# Patient Record
Sex: MALE | Race: WHITE | Employment: FULL TIME | URBAN - METROPOLITAN AREA
[De-identification: names, ages, dates, MRNs, and addresses within clinical notes are randomized per-mention and may not be internally consistent; named-entity substitution may affect disease eponyms.]

---

## 2018-11-12 ENCOUNTER — OFFICE VISIT (OUTPATIENT)
Dept: PHYSICAL THERAPY | Facility: CLINIC | Age: 23
End: 2018-11-12
Payer: COMMERCIAL

## 2018-11-12 DIAGNOSIS — M75.42 IMPINGEMENT SYNDROME OF LEFT SHOULDER: Primary | ICD-10-CM

## 2018-11-12 PROCEDURE — G8984 CARRY CURRENT STATUS: HCPCS | Performed by: PHYSICAL THERAPIST

## 2018-11-12 PROCEDURE — 97161 PT EVAL LOW COMPLEX 20 MIN: CPT | Performed by: PHYSICAL THERAPIST

## 2018-11-12 PROCEDURE — G8985 CARRY GOAL STATUS: HCPCS | Performed by: PHYSICAL THERAPIST

## 2018-11-12 NOTE — LETTER
2018     [address] [place] [address]    Patient: Desean Love   YOB: 1995   Date of Visit: 2018     Encounter Diagnosis     ICD-10-CM    1  Impingement syndrome of left shoulder M75 42        Dear Mr Sherita Redmond is seeking out physical therapy under direct access  Please review the attached Plan of Care from Chuy Perales's recent visit  Please verify that you agree therapy should continue by signing the attached document and sending it back to our office, this will allow patient to continue therapy beyond 30 days if needed  If you have any questions or concerns, please don't hesitate to call  Sincerely,    Debi Jain, PT      Referring Provider:      I certify that I have read the below Plan of Care and certify the need for these services furnished under this plan of treatment while under my care  Shaheed Spring [address]: [phone]          PT Evaluation     Today's date: 2018  Patient name: Desean Love  : 1995  MRN: [id number]  Referring provider: Britton Cornejo PT  Dx:   Encounter Diagnosis     ICD-10-CM    1  Impingement syndrome of left shoulder M75 42                   Assessment  Impairments: abnormal or restricted ROM, impaired physical strength and lacks appropriate home exercise program    Assessment details: Patient presents with episodic left shoulder pain with weight lifting exercises  Patient is seeking out physical therapy under direct access  Demonstrates decrease end-range left shoulder ROM, decrease in b/l trapezius/postural muscles, and decrease in left shoulder strength  Chuy Barrera presents with the impairments as listed above and would benefit from skilled Physical Therapy to address these impairments in order to maximize functional capacity and return to prior level of function   Thank you for this referral!    Goals  Impairment Goals:  1  Decrease left shoulder pain to 0/10 with activities in 8 weeks  2  Pain-Free left shoulder at full ROM in 8 weeks  3  Increase upper extremity strength by ½ grade in 8 weeks    Functional Goals:  1  Patient is able to return to weight lifting at the gym with proper  and no left shoulder pain in 8-12 weeks  2  Patient is able to sleep on left side without left shoulder pain in 8-12 weeks    Plan  Patient would benefit from: PT eval and skilled physical therapy  Planned therapy interventions: home exercise program, therapeutic exercise, postural training, patient education, neuromuscular re-education, manual therapy and joint mobilization  Frequency: 2x week  Duration in weeks: 8  Plan of Care expiration date: 2019  Treatment plan discussed with: patient        Subjective Evaluation    History of Present Illness  Mechanism of injury: 22 yo male presents with recent episode of left shoulder pain 2-3 weeks ago  Pt reports original onset of left shoulder pain 3 years ago with power lifting 275 lbs  Pt would rest and allow pain to subside, but once resuming chest press lifts, left shoulder pain would return  Pt also reports episodes of shoulder subluxation, occuring 2-3 times over the last 3 months with resisted shoulder horzontal abduction in standing  Pt also reports left shoulder pain with playing golf  Pain: deep anterior sharp pain  Hx: Reports clavicle fx at birth    Pain  Current pain ratin  At best pain rating: 3  At worst pain ratin  Location: Left Shoulder  Quality: sharp and dull ache  Progression: improved    Hand dominance: right      Diagnostic Tests  No diagnostic tests performed  Patient Goals  Patient goals for therapy: increased strength, decreased pain and return to sport/leisure activities          Objective     Tenderness     Left Shoulder   Tenderness in the [facility] [place] joint, biceps tendon (proximal), bicipital groove and supraspinatus tendon  Passive Range of Motion   Left Shoulder   Flexion: 180 degrees with pain  Abduction: 180 degrees   External rotation 45°:  65 degrees     Right Shoulder   Normal passive range of motion    Strength/Myotome Testing     Left Shoulder     Planes of Motion   Flexion: 4   Abduction: 4   External rotation at 0°:  4+   Internal rotation at 0°:  4+     Isolated Muscles   Lower trapezius: 3+   Middle trapezius: 3+   Upper trapezius: 4-     Right Shoulder     Planes of Motion   Flexion: 4+   Abduction: 4+   External rotation at 0°:  4+   Internal rotation at 0°:  4+     Isolated Muscles   Lower trapezius: 3+   Middle trapezius: 4-   Upper trapezius: 4     Tests     Left Shoulder   Positive Neer's  Negative drop arm, empty can, full can and Hawkin's         Flowsheet Rows      Most Recent Value   PT/OT G-Codes   Current Score  80   Projected Score  65   Assessment Type  Evaluation   G code set  Carrying, Moving & Handling Objects   Carrying, Moving and Handling Objects Current Status ()  CJ   Carrying, Moving and Handling Objects Goal Status ()  CI          Precautions: Fx Clavicle at birth    Daily Treatment Diary     Manual  11/12            Rhythmic Stabilization                                                                     Exercise Diary  11/12            Prone I on pball 5"x10            Prone T on pball 5"x10            Prone Y on pball 5"x10            Wand Shoulder Flexion S  #            Pec Stretch on foam             Wand Shoulder ER                                                                                                                                                                                                       Modalities

## 2018-11-12 NOTE — PROGRESS NOTES
PT Evaluation     Today's date: 2018  Patient name: Ana Vo  : 1995  MRN: [id number]  Referring provider: Andie Chase PT  Dx:   Encounter Diagnosis     ICD-10-CM    1  Impingement syndrome of left shoulder M75 42                   Assessment  Impairments: abnormal or restricted ROM, impaired physical strength and lacks appropriate home exercise program    Assessment details: Patient presents with episodic left shoulder pain with weight lifting exercises  Patient is seeking out physical therapy under direct access  Demonstrates decrease end-range left shoulder ROM, decrease in b/l trapezius/postural muscles, and decrease in left shoulder strength  Vitaliy Hurt presents with the impairments as listed above and would benefit from skilled Physical Therapy to address these impairments in order to maximize functional capacity and return to prior level of function  Thank you for this referral!    Goals  Impairment Goals:  1  Decrease left shoulder pain to 0/10 with activities in 8 weeks  2  Pain-Free left shoulder at full ROM in 8 weeks  3  Increase upper extremity strength by ½ grade in 8 weeks    Functional Goals:  1  Patient is able to return to weight lifting at the gym with proper  and no left shoulder pain in 8-12 weeks  2  Patient is able to sleep on left side without left shoulder pain in 8-12 weeks    Plan  Patient would benefit from: PT eval and skilled physical therapy  Planned therapy interventions: home exercise program, therapeutic exercise, postural training, patient education, neuromuscular re-education, manual therapy and joint mobilization  Frequency: 2x week  Duration in weeks: 8  Plan of Care expiration date: 2019  Treatment plan discussed with: patient        Subjective Evaluation    History of Present Illness  Mechanism of injury: 24 yo male presents with recent episode of left shoulder pain 2-3 weeks ago   Pt reports original onset of left shoulder pain 3 years ago with power lifting 275 lbs  Pt would rest and allow pain to subside, but once resuming chest press lifts, left shoulder pain would return  Pt also reports episodes of shoulder subluxation, occuring 2-3 times over the last 3 months with resisted shoulder horzontal abduction in standing  Pt also reports left shoulder pain with playing golf  Pain: deep anterior sharp pain  Hx: Reports clavicle fx at birth  Pain  Current pain ratin  At best pain rating: 3  At worst pain ratin  Location: Left Shoulder  Quality: sharp and dull ache  Progression: improved    Hand dominance: right      Diagnostic Tests  No diagnostic tests performed  Patient Goals  Patient goals for therapy: increased strength, decreased pain and return to sport/leisure activities          Objective     Tenderness     Left Shoulder   Tenderness in the [facility] joint, biceps tendon (proximal), bicipital groove and supraspinatus tendon  Passive Range of Motion   Left Shoulder   Flexion: 180 degrees with pain  Abduction: 180 degrees   External rotation 45°: 65 degrees     Right Shoulder   Normal passive range of motion    Strength/Myotome Testing     Left Shoulder     Planes of Motion   Flexion: 4   Abduction: 4   External rotation at 0°: 4+   Internal rotation at 0°: 4+     Isolated Muscles   Lower trapezius: 3+   Middle trapezius: 3+   Upper trapezius: 4-     Right Shoulder     Planes of Motion   Flexion: 4+   Abduction: 4+   External rotation at 0°: 4+   Internal rotation at 0°: 4+     Isolated Muscles   Lower trapezius: 3+   Middle trapezius: 4-   Upper trapezius: 4     Tests     Left Shoulder   Positive Neer's  Negative drop arm, empty can, full can and Hawkin's         Flowsheet Rows      Most Recent Value   PT/OT G-Codes   Current Score  80   Projected Score  65   Assessment Type  Evaluation   G code set  Carrying, Moving & Handling Objects   Carrying, Moving and Handling Objects Current Status ()  CJ   Carrying, Moving and Handling Objects Goal Status ()  CI          Precautions: Fx Clavicle at birth    Daily Treatment Diary     Manual  11/12            Rhythmic Stabilization                                                                     Exercise Diary  11/12            Prone I on pball 5"x10            Prone T on pball 5"x10            Prone Y on pball 5"x10            Wand Shoulder Flexion S  #            Pec Stretch on foam             Wand Shoulder ER                                                                                                                                                                                                       Modalities

## 2018-11-13 ENCOUNTER — TRANSCRIBE ORDERS (OUTPATIENT)
Dept: PHYSICAL THERAPY | Facility: CLINIC | Age: 23
End: 2018-11-13

## 2018-11-13 DIAGNOSIS — M75.42 IMPINGEMENT SYNDROME OF LEFT SHOULDER: Primary | ICD-10-CM

## 2018-11-15 ENCOUNTER — OFFICE VISIT (OUTPATIENT)
Dept: PHYSICAL THERAPY | Facility: CLINIC | Age: 23
End: 2018-11-15
Payer: COMMERCIAL

## 2018-11-15 DIAGNOSIS — M75.42 IMPINGEMENT SYNDROME OF LEFT SHOULDER: Primary | ICD-10-CM

## 2018-11-15 PROCEDURE — 97140 MANUAL THERAPY 1/> REGIONS: CPT | Performed by: PHYSICAL THERAPIST

## 2018-11-15 PROCEDURE — 97110 THERAPEUTIC EXERCISES: CPT | Performed by: PHYSICAL THERAPIST

## 2018-11-15 NOTE — PROGRESS NOTES
Daily Note     Today's date: 11/15/2018  Patient name: Kristie Germain  : 1995  MRN: [id number]  Referring provider: Rufino aCrter, PT  Dx:   Encounter Diagnosis     ICD-10-CM    1  Impingement syndrome of left shoulder M75 42                   Subjective: Pt notes less sharp pain in left shoulder and HEP is getting easier  Objective: See treatment diary below      Assessment: Pt tolerated exercises with increase in knee extension ROM noted compared to IE  Pt noted mild stretch at end-range shoulder flexion and ER  Demonstrates increase in left shoulder ER compared to last visit  Pt noted appropriate challenge with TB D2 and TB Shld ext  Patient demonstrated fatigue post treatment      Plan: Continue per plan of care         Precautions: Fx Clavicle at birth    Daily Treatment Diary     Manual  11/12 11/15           Rhythmic Stabilization  5'           L Shld PROM  3'           PNF pattern D2  2'                                         Exercise Diary  11/12 11/15           Prone I on pball 5"x10 1#  5"x10           Prone T on pball 5"x10 1#  5"x10           Prone Y on pball 5"x10 1#  5"x10           Wand Shoulder Flexion S  # 1 5#  10"x5           Pec Stretch on foam             Wand Shoulder ER  10"x6           UBE  L7  3' bwk           Doorway Pec stretch  10"x5           Shld Ext D2  Red  2x10           TB Shld ext  bluTB  5"x10           Plank  nv           Push-ups                                                                                                                         Modalities

## 2018-11-19 ENCOUNTER — OFFICE VISIT (OUTPATIENT)
Dept: PHYSICAL THERAPY | Facility: CLINIC | Age: 23
End: 2018-11-19
Payer: COMMERCIAL

## 2018-11-19 DIAGNOSIS — M75.42 IMPINGEMENT SYNDROME OF LEFT SHOULDER: Primary | ICD-10-CM

## 2018-11-19 PROCEDURE — 97110 THERAPEUTIC EXERCISES: CPT | Performed by: PHYSICAL THERAPIST

## 2018-11-19 PROCEDURE — 97140 MANUAL THERAPY 1/> REGIONS: CPT | Performed by: PHYSICAL THERAPIST

## 2018-11-19 NOTE — PROGRESS NOTES
Daily Note     Today's date: 2018  Patient name: Rona Ortega  : 1995  MRN: [id number]  Referring provider: Enrique Turner, PT  Dx:   Encounter Diagnosis     ICD-10-CM    1  Impingement syndrome of left shoulder M75 42                   Subjective: Pt reports left shoulder feeling tight this morning, but no anterior shoulder pain  Objective: See treatment diary below      Assessment: Pt noted fatigue and mild onset of back pain with Prone I,T,Y especially during second set  Added planks with challenge noted to core, requiring frequent cues to correct form  Pt unable to tolerate SA punches in plank position due to pain  Pt noted challenge to b/l shoulders in side plank  Provided updated written HEP  Patient would benefit from continued PT  Plan: Continue per plan of care         Precautions: Fx Clavicle at birth    Daily Treatment Diary     Manual  11/12 11/15 11/19          Rhythmic Stabilization  5' 5'          L Shld PROM  3' 5'          PNF pattern D2  2' 2'                                        Exercise Diary  11/12 11/15 11/19          Prone I on pball 5"x10 1#  5"x10 2# 5"  2x10          Prone T on pball 5"x10 1#  5"x10 2# 5"  2x10          Prone Y on pball 5"x10 1#  5"x10 1#  5"x10  0#          Wand Shoulder Flexion S  # 1 5#  10"x5           Wall Bulmaro Stretch   5x          Wand Shoulder ER  10"x6 90*  2#  10"x5          UBE  L7  3' bwk L7  3' bwk          Doorway Pec stretch  10"x5 10"x5          Shld Ext D2  Red  2x10 grn  15x          TB Shld ext  bluTB  5"x10 Black  5"x10          Plank   30"x3          Modified Side Planks   30"x1 ea          Push-Ups                                                                                                            Modalities

## 2018-11-21 ENCOUNTER — OFFICE VISIT (OUTPATIENT)
Dept: PHYSICAL THERAPY | Facility: CLINIC | Age: 23
End: 2018-11-21
Payer: COMMERCIAL

## 2018-11-21 DIAGNOSIS — M75.42 IMPINGEMENT SYNDROME OF LEFT SHOULDER: Primary | ICD-10-CM

## 2018-11-21 PROCEDURE — 97110 THERAPEUTIC EXERCISES: CPT | Performed by: PHYSICAL THERAPIST

## 2018-11-21 PROCEDURE — 97140 MANUAL THERAPY 1/> REGIONS: CPT | Performed by: PHYSICAL THERAPIST

## 2018-11-21 NOTE — PROGRESS NOTES
Daily Note     Today's date: 2018  Patient name: Ryley Sol  : 1995  MRN: [id number]  Referring provider: Gail Sam PT  Dx:   Encounter Diagnosis     ICD-10-CM    1  Impingement syndrome of left shoulder M75 42                   Subjective: Pt notes some left shoulder pain returned with performing triceps extension at the gym yesterday  Objective: See treatment diary below      Assessment: Pt required initial and occasional cues during Prone I, T, Y to avoid lumbar extension and with PNF D2 TB to maintain correct form  Patient demonstrated fatigue post treatment  Plan: Continue per plan of care         Precautions: Fx Clavicle at birth    Daily Treatment Diary     Manual  11/12 11/15 11/19 11/21         Rhythmic Stabilization  5' 5' 5'         L Shld PROM  3' 5' 5'         PNF pattern D2  2' 2' 3'                                       Exercise Diary  11/12 11/15 11/19 11/21         Prone I on pball 5"x10 1#  5"x10 2# 5"  2x10 2#  10x2         Prone T on pball 5"x10 1#  5"x10 2# 5"  2x10 2# 5"  15x/10x         Prone Y on pball 5"x10 1#  5"x10 1#  5"x10  0# 1#  5"x8  0# 5x         Wand Shoulder Flexion S  # 1 5#  10"x5           Wall Bulmaro Stretch with deep breath   5x 10x         Wand Shoulder ER  10"x6 90*  2#  10"x5 90*  2#  10"x5         UBE  L7  3' bwk L7  3' bwk L7  3' bwk         Doorway Pec stretch  10"x5 10"x5 10"x5         Shld Ext D2  Red  2x10 grn  15x grn  15x/10x L           TB Shld ext  bluTB  5"x10 Black  5"x10 Black  5"x10         Plank   30"x3 30"x3         Modified Side Planks   30"x1 ea 15"x3 ea         Push-Ups             Pulling shoulder flexion stretch CC    10"x5         Shoulder ER  "No Money"    Blue  10x                                                                              Modalities

## 2018-11-26 ENCOUNTER — OFFICE VISIT (OUTPATIENT)
Dept: PHYSICAL THERAPY | Facility: CLINIC | Age: 23
End: 2018-11-26
Payer: COMMERCIAL

## 2018-11-26 DIAGNOSIS — M75.42 IMPINGEMENT SYNDROME OF LEFT SHOULDER: Primary | ICD-10-CM

## 2018-11-26 PROCEDURE — 97140 MANUAL THERAPY 1/> REGIONS: CPT | Performed by: PHYSICAL THERAPIST

## 2018-11-26 PROCEDURE — 97110 THERAPEUTIC EXERCISES: CPT | Performed by: PHYSICAL THERAPIST

## 2018-11-26 NOTE — PROGRESS NOTES
Daily Note     Today's date: 2018  Patient name: Flores Shoemaker  : 1995  MRN: [id number]  Referring provider: Micky Moe, PT  Dx:   Encounter Diagnosis     ICD-10-CM    1  Impingement syndrome of left shoulder M75 42                   Subjective: Pt reports no longer experiencing left anterior shoulder pain, but noting "knot" in left UT  Objective: See treatment diary below      Assessment: Demonstrates improvement in posture during prone I and T, but mild lumbar extension compensation still noted with prone Y  Severe muscle spasm noted in left UT with notably released with TPR  Patient demonstrated fatigue post treatment      Plan: Continue per plan of care         Precautions: Fx Clavicle at birth    Daily Treatment Diary     Manual  11/12 11/15 11/19 11/21 11/26        Rhythmic Stabilization  5' 5' 5' 5'        L Shld PROM  3' 5' 5' 5'        PNF pattern D2  2' 2' 3' 2'        TPR to L UT     10'                         Exercise Diary  11/12 11/15 11/19 11/21 11/26        Prone I on pball 5"x10 1#  5"x10 2# 5"  2x10 2#  10x2 2#  10x2        Prone T on pball 5"x10 1#  5"x10 2# 5"  2x10 2# 5"  15x/10x 2#  10x2        Prone Y on pball 5"x10 1#  5"x10 1#  5"x10  0# 1#  5"x8  0# 5x 1#  10x  0# 10x        Wand Shoulder Flexion S  # 1 5#  10"x5           Wall Bulmaro Stretch with deep breath   5x 10x nv        Wand Shoulder ER  10"x6 90*  2#  10"x5 90*  2#  10"x5 90*  2#  10"x5        UBE  L7  3' bwk L7  3' bwk L7  3' bwk L7  3' bwk        Doorway Pec stretch  10"x5 10"x5 10"x5 10"x5        Shld Ext D2  Red  2x10 grn  15x grn  15x/10x L           TB Shld ext  bluTB  5"x10 Black  5"x10 Black  5"x10         Plank   30"x3 30"x3         Modified Side Planks   30"x1 ea 15"x3 ea         Push-Ups             Pulling shoulder flexion stretch CC    10"x5         Shoulder ER  "No Money"    Blue  10x         UT stretch     10"x5 L        TB shoulder shrugs     Black  10x Modalities

## 2018-11-29 ENCOUNTER — OFFICE VISIT (OUTPATIENT)
Dept: PHYSICAL THERAPY | Facility: CLINIC | Age: 23
End: 2018-11-29
Payer: COMMERCIAL

## 2018-11-29 DIAGNOSIS — M75.42 IMPINGEMENT SYNDROME OF LEFT SHOULDER: Primary | ICD-10-CM

## 2018-11-29 PROCEDURE — 97140 MANUAL THERAPY 1/> REGIONS: CPT | Performed by: PHYSICAL THERAPIST

## 2018-11-29 PROCEDURE — 97110 THERAPEUTIC EXERCISES: CPT | Performed by: PHYSICAL THERAPIST

## 2018-11-29 NOTE — PROGRESS NOTES
Daily Note     Today's date: 2018  Patient name: Cassandra Garcia  : 1995  MRN: [id number]  Referring provider: Vijaya Pat, PT  Dx:   Encounter Diagnosis     ICD-10-CM    1  Impingement syndrome of left shoulder M75 42                   Subjective: Pt note gradual decrease in UT muscle spasm since last visit  Objective: See treatment diary below      Assessment: Pt tolerated exercises without complaints and improvement in body mechanics with less cues  Added push-ups with fatigue noted and initial left shoulder pain noted at end-range push-up  Patient would benefit from continued PT  Plan: Continue per plan of care         Precautions: Fx Clavicle at birth    Daily Treatment Diary     Manual  11/12 11/15 11/19 11/21 11/26 11/29       Rhythmic Stabilization  5' 5' 5' 5' 5'       L Shld PROM  3' 5' 5' 5' 3'       PNF pattern D2  2' 2' 3' 2' 2'       TPR to L UT     10' 5'                        Exercise Diary  11/12 11/15 11/19 11/21 11/26 11/29       Prone I on pball 5"x10 1#  5"x10 2# 5"  2x10 2#  10x2 2#  10x2 2#  2x10       Prone T on pball 5"x10 1#  5"x10 2# 5"  2x10 2# 5"  15x/10x 2#  10x2 2#  2x10       Prone Y on pball 5"x10 1#  5"x10 1#  5"x10  0# 1#  5"x8  0# 5x 1#  10x  0# 10x 2#  2x10       Wand Shoulder Flexion S  # 1 5#  10"x5           Wall Bulmaro Stretch with deep breath   5x 10x nv 10x       Wand Shoulder ER  10"x6 90*  2#  10"x5 90*  2#  10"x5 90*  2#  10"x5 90*  2#  10"x5       UBE  L7  3' bwk L7  3' bwk L7  3' bwk L7  3' bwk L7  3' bwk       Doorway Pec stretch  10"x5 10"x5 10"x5 10"x5        Shld Ext D2  Red  2x10 grn  15x grn  15x/10x L    grn  10x ea       TB Shld ext  bluTB  5"x10 Black  5"x10 Black  5"x10  Black  5"  15x2       Plank   30"x3 30"x3         Modified Side Planks   30"x1 ea 15"x3 ea         Push-Ups             Pulling shoulder flexion stretch CC    10"x5         Shoulder ER  "No Money"    Blue  10x  Blue  10x       UT stretch     10"x5 L        TB shoulder shrugs     Black  10x                                                   Modalities

## 2018-12-06 ENCOUNTER — OFFICE VISIT (OUTPATIENT)
Dept: PHYSICAL THERAPY | Facility: CLINIC | Age: 23
End: 2018-12-06
Payer: COMMERCIAL

## 2018-12-06 DIAGNOSIS — M75.42 IMPINGEMENT SYNDROME OF LEFT SHOULDER: Primary | ICD-10-CM

## 2018-12-06 PROCEDURE — 97110 THERAPEUTIC EXERCISES: CPT | Performed by: PHYSICAL THERAPIST

## 2018-12-06 PROCEDURE — 97140 MANUAL THERAPY 1/> REGIONS: CPT | Performed by: PHYSICAL THERAPIST

## 2018-12-06 NOTE — PROGRESS NOTES
Daily Note     Today's date: 2018  Patient name: Chichi Nicholas  : 1995  MRN: [id number]  Referring provider: Marissa Marshall, PT  Dx:   Encounter Diagnosis     ICD-10-CM    1  Impingement syndrome of left shoulder M75 42                   Subjective: Pt notes left shoulder pain with attempted pull-ups, but able to perform overhead weight lifts with 20# and lat pull downs without shoulder pain at the gym  Objective: See treatment diary below      Assessment: Assessed pt's overhead lift with 20# and chest press form  Added shoulder ER stretch with wand in chest press position  Demonstrates increase in strength with prone I, T, Y  Pt tolerated manuals with stretch noted at 145* horizontal abduction  Patient demonstrated fatigue post treatment      Plan: Continue per plan of care         Precautions: Fx Clavicle at birth    Daily Treatment Diary     Manual  11/12 11/15 11/19 11/21 11/26 11/29 12/6      Rhythmic Stabilization  5' 5' 5' 5' 5'       L Shld PROM  3' 5' 5' 5' 3' 5'      PNF pattern D2  2' 2' 3' 2' 2'       TPR to L UT     10' 5' 3'                       Exercise Diary  11/12 11/15 11/19 11/21 11/26 11/29 12/6      Prone I on pball 5"x10 1#  5"x10 2# 5"  2x10 2#  10x2 2#  10x2 2#  2x10 3#  2x10      Prone T on pball 5"x10 1#  5"x10 2# 5"  2x10 2# 5"  15x/10x 2#  10x2 2#  2x10 3#  2x10      Prone Y on pball 5"x10 1#  5"x10 1#  5"x10  0# 1#  5"x8  0# 5x 1#  10x  0# 10x 2#  2x10 3#  2x10      Chest press  Shoulder ER S       10"x10      Wall Bulmaro Stretch with deep breath   5x 10x nv 10x       Wand Shoulder ER  10"x6 90*  2#  10"x5 90*  2#  10"x5 90*  2#  10"x5 90*  2#  10"x5       UBE  L7  3' bwk L7  3' bwk L7  3' bwk L7  3' bwk L7  3' bwk L7  3' bwk      Doorway Pec stretch  10"x5 10"x5 10"x5 10"x5        Shld Ext D2  Red  2x10 grn  15x grn  15x/10x L    grn  10x ea       TB Shld ext  bluTB  5"x10 Black  5"x10 Black  5"x10  Black  5"  15x2       Plank   30"x3 30"x3         Modified Side Planks 30"x1 ea 15"x3 ea         Push-Ups             Pulling shoulder flexion stretch CC    10"x5         Shoulder ER  "No Money"    Blue  10x  Blue  10x       UT stretch     10"x5 L        TB shoulder shrugs     Black  10x        Pec S on foam roll       30"x3      Bulmaro wings on foam roll       10x      Scaption       7# 20x          Modalities

## 2018-12-13 ENCOUNTER — OFFICE VISIT (OUTPATIENT)
Dept: PHYSICAL THERAPY | Facility: CLINIC | Age: 23
End: 2018-12-13
Payer: COMMERCIAL

## 2018-12-13 DIAGNOSIS — M75.42 IMPINGEMENT SYNDROME OF LEFT SHOULDER: Primary | ICD-10-CM

## 2018-12-13 PROCEDURE — 97112 NEUROMUSCULAR REEDUCATION: CPT | Performed by: PHYSICAL THERAPIST

## 2018-12-13 PROCEDURE — 97110 THERAPEUTIC EXERCISES: CPT | Performed by: PHYSICAL THERAPIST

## 2018-12-13 NOTE — PROGRESS NOTES
Daily Note     Today's date: 2018  Patient name: Desean Love  : 1995  MRN: [id number]  Referring provider: Britton Cornejo, PT  Dx:   Encounter Diagnosis     ICD-10-CM    1  Impingement syndrome of left shoulder M75 42                   Subjective: Pt notes he has been able to bench press with the 45lb bar with some tightness noted in posterior scap following, but performed more stretches today post bench press and noted less symptoms  Pt still notes pain with attempted dips  Objective: See treatment diary below      Assessment: Reviewed pt's gym routine and provided various tips of adding dynamic core and shoulder exercises focusing on postural muscle  Instructed pt in hand planks with rotation, kneeling on foam TB PNF patterns, plank pball crunches, core oblique SA punches on pball, push-ups on Bosu  Added # with left shoulder self stretches into horizontal scapation ROM and shoulder ER at 45* abd, pt noted increase in stretch without pain  Patient demonstrated fatigue post treatment  Plan: Continue per plan of care         Precautions: Fx Clavicle at birth    Daily Treatment Diary     Manual  11/12 11/15 11/19 11/21 11/26 11/29 12/6 12/13     Rhythmic Stabilization  5' 5' 5' 5' 5'  2'     L Shld PROM  3' 5' 5' 5' 3' 5' 5'     PNF pattern D2  2' 2' 3' 2' 2'       TPR to L UT     10' 5' 3' 5'                      Exercise Diary  11/12 11/15 11/19 11/21 11/26 11/29 12/6 12/13     Prone I on pball 5"x10 1#  5"x10 2# 5"  2x10 2#  10x2 2#  10x2 2#  2x10 3#  2x10      Prone T on pball 5"x10 1#  5"x10 2# 5"  2x10 2# 5"  15x/10x 2#  10x2 2#  2x10 3#  2x10      Prone Y on pball 5"x10 1#  5"x10 1#  5"x10  0# 1#  5"x8  0# 5x 1#  10x  0# 10x 2#  2x10 3#  2x10      Chest press  Shoulder ER S       10"x10      Wall Bulmaro Stretch with deep breath   5x 10x nv 10x  5x     Shoulder ER stretch at 45*  10"x6 90*  2#  10"x5 90*  2#  10"x5 90*  2#  10"x5 90*  2#  10"x5  4#  10"x5     UBE  L7  3' bwk L7  3' bwk L7  3' bwk L7  3' bwk L7  3' bwk L7  3' bwk L7  3' bwk     Doorway Pec stretch  10"x5 10"x5 10"x5 10"x5        Shld Ext D2  Red  2x10 grn  15x grn  15x/10x L    grn  10x ea       TB Shld ext  bluTB  5"x10 Black  5"x10 Black  5"x10  Black  5"  15x2       Plank   30"x3 30"x3    3x  On hands with rotation     Modified Side Planks   30"x1 ea 15"x3 ea    1x ea     Push-Ups        On bosu     Gym exercises    10"x5    15'     Shoulder ER  "No Money"    Blue  10x  Blue  10x       UT stretch     10"x5 L   10"x2 ea     TB shoulder shrugs     Black  10x        Horizontal scap stretch supine       5#  10"x5      Bulmaro wings on foam roll       10x      Scaption       7# 20x 8# 10x         Modalities

## 2018-12-20 ENCOUNTER — OFFICE VISIT (OUTPATIENT)
Dept: PHYSICAL THERAPY | Facility: CLINIC | Age: 23
End: 2018-12-20
Payer: COMMERCIAL

## 2018-12-20 DIAGNOSIS — M75.42 IMPINGEMENT SYNDROME OF LEFT SHOULDER: Primary | ICD-10-CM

## 2018-12-20 PROCEDURE — 97140 MANUAL THERAPY 1/> REGIONS: CPT | Performed by: PHYSICAL THERAPIST

## 2018-12-20 PROCEDURE — 97112 NEUROMUSCULAR REEDUCATION: CPT | Performed by: PHYSICAL THERAPIST

## 2018-12-20 NOTE — PROGRESS NOTES
Daily Note     Today's date: 2018  Patient name: Desean Love  : 1995  MRN: [id number]  Referring provider: Britton Cornejo, PT  Dx:   Encounter Diagnosis     ICD-10-CM    1  Impingement syndrome of left shoulder M75 42                   Subjective: Pt reports he is recovering from the stomach bug and not feeling up to performing exercises, but requesting manuals and c/o new onset of dizziness since illness on Monday  Objective: See treatment diary below      Assessment: Pt tolerated manuals with TPR to UT/levator  Assessed oculomotor and vestibulare systems due to dizziness through DA  Pt notes mild increase in dizziness with quick head movements, but negative saccades, horizontal head thrust, and positional testing  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 and instructed to return next week for therapy to dizziness if symptoms persist  Patient would benefit from continued PT      Plan: Continue per plan of care  Plan to re-assess next visit        Precautions: Fx Clavicle at birth    Daily Treatment Diary     Manual  11/12 11/15 11/19 11/21 11/26 11/29 12/6 12/13 12/20    Rhythmic Stabilization  5' 5' 5' 5' 5'  2'     L Shld PROM  3' 5' 5' 5' 3' 5' 5'     PNF pattern D2  2' 2' 3' 2' 2'       TPR to L UT     10' 5' 3' 5' 10'    Thoracic mobs         5'        Exercise Diary  11/12 11/15 11/19 11/21 11/26 11/29 12/6 12/13 12/20    Prone I on pball 5"x10 1#  5"x10 2# 5"  2x10 2#  10x2 2#  10x2 2#  2x10 3#  2x10      Prone T on pball 5"x10 1#  5"x10 2# 5"  2x10 2# 5"  15x/10x 2#  10x2 2#  2x10 3#  2x10      Prone Y on pball 5"x10 1#  5"x10 1#  5"x10  0# 1#  5"x8  0# 5x 1#  10x  0# 10x 2#  2x10 3#  2x10      Chest press  Shoulder ER S       10"x10      Wall Bulmaro Stretch with deep breath   5x 10x nv 10x  5x     Shoulder ER stretch at 45*  10"x6 90*  2#  10"x5 90*  2#  10"x5 90*  2#  10"x5 90*  2#  10"x5  4#  10"x5     UBE  L7  3' bwk L7  3' bwk L7  3' bwk L7  3' bwk L7  3' bwk L7  3' bwk L7  3' bwk Doorway Pec stretch  10"x5 10"x5 10"x5 10"x5        Shld Ext D2  Red  2x10 grn  15x grn  15x/10x L    grn  10x ea       TB Shld ext  bluTB  5"x10 Black  5"x10 Black  5"x10  Black  5"  15x2       Plank   30"x3 30"x3    3x  On hands with rotation     Modified Side Planks   30"x1 ea 15"x3 ea    1x ea     Push-Ups        On bosu     Gym exercises    10"x5    15'     Shoulder ER  "No Money"    Blue  10x  Blue  10x       UT stretch     10"x5 L   10"x2 ea     TB shoulder shrugs     Black  10x        Horizontal scap stretch supine       5#  10"x5      Bulmaro wings on foam roll       10x      Scaption       7# 20x 8# 10x         Modalities

## 2018-12-27 ENCOUNTER — EVALUATION (OUTPATIENT)
Dept: PHYSICAL THERAPY | Facility: CLINIC | Age: 23
End: 2018-12-27
Payer: COMMERCIAL

## 2018-12-27 DIAGNOSIS — M75.42 IMPINGEMENT SYNDROME OF LEFT SHOULDER: Primary | ICD-10-CM

## 2018-12-27 PROCEDURE — G8992 OTHER PT/OT  D/C STATUS: HCPCS | Performed by: PHYSICAL THERAPIST

## 2018-12-27 PROCEDURE — G8991 OTHER PT/OT GOAL STATUS: HCPCS | Performed by: PHYSICAL THERAPIST

## 2018-12-27 PROCEDURE — 97530 THERAPEUTIC ACTIVITIES: CPT | Performed by: PHYSICAL THERAPIST

## 2018-12-27 PROCEDURE — 97110 THERAPEUTIC EXERCISES: CPT | Performed by: PHYSICAL THERAPIST

## 2018-12-27 NOTE — LETTER
2019    [address], [address] [place] [address]    Patient: Aretha Lopes   YOB: 1995   Date of Visit: 2018     Encounter Diagnosis     ICD-10-CM    1  Impingement syndrome of left shoulder M75 42        Dear Dr Galicia:    Please review the attached Plan of Care from Deep Perales's recent visit  Please verify that you agree therapy should continue by signing the attached document and sending it back to our office  If you have any questions or concerns, please don't hesitate to call  Sincerely,    Juana Romero, PT      Referring Provider:      I certify that I have read the below Plan of Care and certify the need for these services furnished under this plan of treatment while under my care  [address], [address] [place] [address]  VIA Facsimile: [phone]          PT Discharge    Today's date: 2018  Patient name: Aretha Lopes  : 1995  MRN: [id number]  Referring provider: Jennie Baron, PT  Dx:   Encounter Diagnosis     ICD-10-CM    1  Impingement syndrome of left shoulder M75 42                   Assessment  Assessment details: Pt reports full recovery left shoulder pain with return to prior level of function  Pt is able to sleep on left shoulder without pain and returned to working out at the gym without complaints  Pt notes attempting his golf swing and notes tightness in left shoulder with horizontal adduction movement  Demonstrates full left shoulder ROM without pain or capsular tightness and increase in b/l upper extremity strength  Patient is pleased with his recovery and agrees to continue gains through HEP and working out at the gym  Reviewed exercises and progressions  Self-discharge at this time  Goals  Impairment Goals:  1   Decrease left shoulder pain to 0/10 with activities in 8 weeks - MET  2  Pain-Free left shoulder at full ROM in 8 weeks - MET  3  Increase upper extremity strength by ½ grade in 8 weeks - MET    Functional Goals:  1  Patient is able to return to weight lifting at the gym with proper  and no left shoulder pain in 8-12 weeks - MET  2  Patient is able to sleep on left side without left shoulder pain in 8-12 weeks - MET    Plan  Patient would benefit from: PT eval and skilled physical therapy  Plan of Care expiration date: 2018  Treatment plan discussed with: patient        Subjective Evaluation    History of Present Illness  Mechanism of injury: Pt notes he was able to chest press with 50# dumbbells and floor press with 95# barbell without left shoulder pain  Pain  Current pain ratin  At best pain ratin  At worst pain ratin  Location: Left Shoulder           Objective     Passive Range of Motion   Left Shoulder   Flexion: 180 degrees   Abduction: 180 degrees   External rotation 45°:  82 degrees   External rotation 90°:  90 degrees     Strength/Myotome Testing     Left Shoulder     Planes of Motion   Flexion: 4+   Abduction: 5   External rotation at 0°:  5   Internal rotation at 0°:  5     Isolated Muscles   Lower trapezius: 5   Middle trapezius: 4   Upper trapezius: 4     Right Shoulder     Planes of Motion   Flexion: 4+   Abduction: 4+   External rotation at 0°:  5   Internal rotation at 0°:  5     Isolated Muscles   Lower trapezius: 5   Middle trapezius: 4+   Upper trapezius: 4+     Tests     Right Shoulder   Negative Neer's             Precautions: Fx Clavicle at birth    Daily Treatment Diary     Manual  11/12 11/15 11/19 11/21 11/26 11/29 12/6 12/13 12/20 12/27   Rhythmic Stabilization  5' 5' 5' 5' 5'  2'     L Shld PROM  3' 5' 5' 5' 3' 5' 5'  5'   PNF pattern D2  2' 2' 3' 2' 2'       TPR to L UT     10' 5' 3' 5' 10'    Thoracic mobs         5'        Exercise Diary  11/12 11/15 11/19 11/21 11/26 11/29 12/6 12/13 12/20 12/27   Prone I on pball 5"x10 1#  5"x10 2# 5"  2x10 2#  10x2 2#  10x2 2#  2x10 3#  2x10   4#  2x10   Prone T on pball 5"x10 1#  5"x10 2# 5"  2x10 2# 5"  15x/10x 2#  10x2 2#  2x10 3#  2x10   4#  2x10   Prone Y on pball 5"x10 1#  5"x10 1#  5"x10  0# 1#  5"x8  0# 5x 1#  10x  0# 10x 2#  2x10 3#  2x10   4#  2x10   Chest press  Shoulder ER S       10"x10      Wall Bulmaro Stretch with deep breath   5x 10x nv 10x  5x     Shoulder ER stretch at 45*  10"x6 90*  2#  10"x5 90*  2#  10"x5 90*  2#  10"x5 90*  2#  10"x5  4#  10"x5     UBE  L7  3' bwk L7  3' bwk L7  3' bwk L7  3' bwk L7  3' bwk L7  3' bwk L7  3' bwk     Doorway Pec stretch  10"x5 10"x5 10"x5 10"x5        Shld Ext D2  Red  2x10 grn  15x grn  15x/10x L    grn  10x ea       TB Shld ext  bluTB  5"x10 Black  5"x10 Black  5"x10  Black  5"  15x2       Plank   30"x3 30"x3    3x  On hands with rotation     Modified Side Planks   30"x1 ea 15"x3 ea    1x ea     Push-Ups        On bosu     Gym exercises    10"x5    15'     Shoulder ER  "No Money"    Blue  10x  Blue  10x    Blue  10x   UT stretch     10"x5 L   10"x2 ea     TB shoulder shrugs     Black  10x        Horizontal scap stretch supine       5#  10"x5      Bulmaro wings on foam roll       10x      Scaption       7# 20x 8# 10x  Flexion  10# 10x       Modalities

## 2018-12-27 NOTE — PROGRESS NOTES
PT Discharge    Today's date: 2018  Patient name: Miracle uZniga  : 1995  MRN: [id number]  Referring provider: Aissatou Feliciano PT  Dx:   Encounter Diagnosis     ICD-10-CM    1  Impingement syndrome of left shoulder M75 42                   Assessment  Assessment details: Pt reports full recovery left shoulder pain with return to prior level of function  Pt is able to sleep on left shoulder without pain and returned to working out at the gym without complaints  Pt notes attempting his golf swing and notes tightness in left shoulder with horizontal adduction movement  Demonstrates full left shoulder ROM without pain or capsular tightness and increase in b/l upper extremity strength  Patient is pleased with his recovery and agrees to continue gains through HEP and working out at the gym  Reviewed exercises and progressions  Self-discharge at this time  Goals  Impairment Goals:  1  Decrease left shoulder pain to 0/10 with activities in 8 weeks - MET  2  Pain-Free left shoulder at full ROM in 8 weeks - MET  3  Increase upper extremity strength by ½ grade in 8 weeks - MET    Functional Goals:  1  Patient is able to return to weight lifting at the gym with proper  and no left shoulder pain in 8-12 weeks - MET  2  Patient is able to sleep on left side without left shoulder pain in 8-12 weeks - MET    Plan  Patient would benefit from: PT eval and skilled physical therapy  Plan of Care expiration date: 2018  Treatment plan discussed with: patient        Subjective Evaluation    History of Present Illness  Mechanism of injury: Pt notes he was able to chest press with 50# dumbbells and floor press with 95# barbell without left shoulder pain    Pain  Current pain ratin  At best pain ratin  At worst pain ratin  Location: Left Shoulder           Objective     Passive Range of Motion   Left Shoulder   Flexion: 180 degrees   Abduction: 180 degrees   External rotation 45°: 82 degrees External rotation 90°: 90 degrees     Strength/Myotome Testing     Left Shoulder     Planes of Motion   Flexion: 4+   Abduction: 5   External rotation at 0°: 5   Internal rotation at 0°: 5     Isolated Muscles   Lower trapezius: 5   Middle trapezius: 4   Upper trapezius: 4     Right Shoulder     Planes of Motion   Flexion: 4+   Abduction: 4+   External rotation at 0°: 5   Internal rotation at 0°: 5     Isolated Muscles   Lower trapezius: 5   Middle trapezius: 4+   Upper trapezius: 4+     Tests     Right Shoulder   Negative Neer's             Precautions: Fx Clavicle at birth    Daily Treatment Diary     Manual  11/12 11/15 11/19 11/21 11/26 11/29 12/6 12/13 12/20 12/27   Rhythmic Stabilization  5' 5' 5' 5' 5'  2'     L Shld PROM  3' 5' 5' 5' 3' 5' 5'  5'   PNF pattern D2  2' 2' 3' 2' 2'       TPR to L UT     10' 5' 3' 5' 10'    Thoracic mobs         5'        Exercise Diary  11/12 11/15 11/19 11/21 11/26 11/29 12/6 12/13 12/20 12/27   Prone I on pball 5"x10 1#  5"x10 2# 5"  2x10 2#  10x2 2#  10x2 2#  2x10 3#  2x10   4#  2x10   Prone T on pball 5"x10 1#  5"x10 2# 5"  2x10 2# 5"  15x/10x 2#  10x2 2#  2x10 3#  2x10   4#  2x10   Prone Y on pball 5"x10 1#  5"x10 1#  5"x10  0# 1#  5"x8  0# 5x 1#  10x  0# 10x 2#  2x10 3#  2x10   4#  2x10   Chest press  Shoulder ER S       10"x10      Wall Bulmaro Stretch with deep breath   5x 10x nv 10x  5x     Shoulder ER stretch at 45*  10"x6 90*  2#  10"x5 90*  2#  10"x5 90*  2#  10"x5 90*  2#  10"x5  4#  10"x5     UBE  L7  3' bwk L7  3' bwk L7  3' bwk L7  3' bwk L7  3' bwk L7  3' bwk L7  3' bwk     Doorway Pec stretch  10"x5 10"x5 10"x5 10"x5        Shld Ext D2  Red  2x10 grn  15x grn  15x/10x L    grn  10x ea       TB Shld ext  bluTB  5"x10 Black  5"x10 Black  5"x10  Black  5"  15x2       Plank   30"x3 30"x3    3x  On hands with rotation     Modified Side Planks   30"x1 ea 15"x3 ea    1x ea     Push-Ups        On bosu     Gym exercises    10"x5    15'     Shoulder ER  "No Money" Blue  10x  Blue  10x    Blue  10x   UT stretch     10"x5 L   10"x2 ea     TB shoulder shrugs     Black  10x        Horizontal scap stretch supine       5#  10"x5      Bulmaro wings on foam roll       10x      Scaption       7# 20x 8# 10x  Flexion  10# 10x       Modalities

## 2019-01-02 ENCOUNTER — TRANSCRIBE ORDERS (OUTPATIENT)
Dept: PHYSICAL THERAPY | Facility: CLINIC | Age: 24
End: 2019-01-02

## 2019-01-02 DIAGNOSIS — M75.42 IMPINGEMENT SYNDROME OF LEFT SHOULDER: Primary | ICD-10-CM
